# Patient Record
Sex: MALE | Race: WHITE | Employment: OTHER
[De-identification: names, ages, dates, MRNs, and addresses within clinical notes are randomized per-mention and may not be internally consistent; named-entity substitution may affect disease eponyms.]

---

## 2017-01-01 ENCOUNTER — HOME CARE VISIT (OUTPATIENT)
Dept: SCHEDULING | Facility: HOME HEALTH | Age: 82
End: 2017-01-01
Payer: MEDICARE

## 2017-01-01 ENCOUNTER — DOCUMENTATION ONLY (OUTPATIENT)
Dept: INTERNAL MEDICINE CLINIC | Age: 82
End: 2017-01-01

## 2017-01-01 ENCOUNTER — OFFICE VISIT (OUTPATIENT)
Dept: INTERNAL MEDICINE CLINIC | Age: 82
End: 2017-01-01

## 2017-01-01 ENCOUNTER — TELEPHONE (OUTPATIENT)
Dept: INTERNAL MEDICINE CLINIC | Age: 82
End: 2017-01-01

## 2017-01-01 ENCOUNTER — HOME HEALTH ADMISSION (OUTPATIENT)
Dept: HOME HEALTH SERVICES | Facility: HOME HEALTH | Age: 82
End: 2017-01-01
Payer: MEDICARE

## 2017-01-01 ENCOUNTER — OFFICE VISIT (OUTPATIENT)
Dept: NEUROLOGY | Age: 82
End: 2017-01-01

## 2017-01-01 ENCOUNTER — PATIENT OUTREACH (OUTPATIENT)
Dept: INTERNAL MEDICINE CLINIC | Age: 82
End: 2017-01-01

## 2017-01-01 ENCOUNTER — HOSPITAL ENCOUNTER (OUTPATIENT)
Dept: GENERAL RADIOLOGY | Age: 82
Discharge: HOME | End: 2017-04-21
Attending: INTERNAL MEDICINE
Payer: MEDICARE

## 2017-01-01 ENCOUNTER — HOSPITAL ENCOUNTER (OUTPATIENT)
Dept: GENERAL RADIOLOGY | Age: 82
Discharge: HOME | End: 2017-01-26
Payer: MEDICARE

## 2017-01-01 ENCOUNTER — HOSPITAL ENCOUNTER (OUTPATIENT)
Dept: LAB | Age: 82
Discharge: HOME | End: 2017-04-21
Payer: MEDICARE

## 2017-01-01 VITALS
OXYGEN SATURATION: 98 % | DIASTOLIC BLOOD PRESSURE: 78 MMHG | RESPIRATION RATE: 16 BRPM | HEART RATE: 76 BPM | SYSTOLIC BLOOD PRESSURE: 135 MMHG | TEMPERATURE: 98.6 F

## 2017-01-01 VITALS
TEMPERATURE: 98.6 F | OXYGEN SATURATION: 98 % | HEART RATE: 75 BPM | SYSTOLIC BLOOD PRESSURE: 124 MMHG | RESPIRATION RATE: 16 BRPM | DIASTOLIC BLOOD PRESSURE: 64 MMHG

## 2017-01-01 VITALS
WEIGHT: 132 LBS | OXYGEN SATURATION: 97 % | HEART RATE: 60 BPM | SYSTOLIC BLOOD PRESSURE: 120 MMHG | DIASTOLIC BLOOD PRESSURE: 60 MMHG | HEIGHT: 68 IN | BODY MASS INDEX: 20 KG/M2

## 2017-01-01 VITALS
HEART RATE: 78 BPM | OXYGEN SATURATION: 97 % | RESPIRATION RATE: 18 BRPM | DIASTOLIC BLOOD PRESSURE: 72 MMHG | SYSTOLIC BLOOD PRESSURE: 135 MMHG | TEMPERATURE: 98.6 F

## 2017-01-01 VITALS
TEMPERATURE: 98.6 F | HEART RATE: 78 BPM | TEMPERATURE: 98.6 F | HEART RATE: 75 BPM | SYSTOLIC BLOOD PRESSURE: 136 MMHG | RESPIRATION RATE: 16 BRPM | OXYGEN SATURATION: 98 % | SYSTOLIC BLOOD PRESSURE: 128 MMHG | OXYGEN SATURATION: 98 % | DIASTOLIC BLOOD PRESSURE: 65 MMHG | RESPIRATION RATE: 16 BRPM | DIASTOLIC BLOOD PRESSURE: 68 MMHG

## 2017-01-01 VITALS
RESPIRATION RATE: 12 BRPM | BODY MASS INDEX: 19.85 KG/M2 | HEART RATE: 57 BPM | SYSTOLIC BLOOD PRESSURE: 142 MMHG | OXYGEN SATURATION: 97 % | WEIGHT: 131 LBS | DIASTOLIC BLOOD PRESSURE: 52 MMHG | TEMPERATURE: 97.5 F | HEIGHT: 68 IN

## 2017-01-01 VITALS
OXYGEN SATURATION: 97 % | RESPIRATION RATE: 16 BRPM | WEIGHT: 132 LBS | BODY MASS INDEX: 20 KG/M2 | DIASTOLIC BLOOD PRESSURE: 50 MMHG | SYSTOLIC BLOOD PRESSURE: 122 MMHG | HEART RATE: 54 BPM | TEMPERATURE: 97.4 F | HEIGHT: 68 IN

## 2017-01-01 VITALS
DIASTOLIC BLOOD PRESSURE: 68 MMHG | RESPIRATION RATE: 16 BRPM | TEMPERATURE: 97.6 F | OXYGEN SATURATION: 98 % | HEART RATE: 79 BPM | SYSTOLIC BLOOD PRESSURE: 133 MMHG

## 2017-01-01 VITALS
SYSTOLIC BLOOD PRESSURE: 134 MMHG | HEART RATE: 75 BPM | OXYGEN SATURATION: 98 % | TEMPERATURE: 98.5 F | DIASTOLIC BLOOD PRESSURE: 69 MMHG | RESPIRATION RATE: 16 BRPM

## 2017-01-01 VITALS
TEMPERATURE: 98.6 F | OXYGEN SATURATION: 97 % | RESPIRATION RATE: 20 BRPM | SYSTOLIC BLOOD PRESSURE: 118 MMHG | HEART RATE: 62 BPM | DIASTOLIC BLOOD PRESSURE: 60 MMHG

## 2017-01-01 VITALS
TEMPERATURE: 98.6 F | DIASTOLIC BLOOD PRESSURE: 65 MMHG | HEART RATE: 79 BPM | SYSTOLIC BLOOD PRESSURE: 134 MMHG | OXYGEN SATURATION: 98 % | RESPIRATION RATE: 16 BRPM

## 2017-01-01 VITALS
RESPIRATION RATE: 16 BRPM | DIASTOLIC BLOOD PRESSURE: 68 MMHG | OXYGEN SATURATION: 98 % | HEART RATE: 77 BPM | TEMPERATURE: 98.5 F | SYSTOLIC BLOOD PRESSURE: 132 MMHG

## 2017-01-01 VITALS
SYSTOLIC BLOOD PRESSURE: 127 MMHG | DIASTOLIC BLOOD PRESSURE: 68 MMHG | OXYGEN SATURATION: 98 % | HEART RATE: 76 BPM | RESPIRATION RATE: 16 BRPM | TEMPERATURE: 98.6 F

## 2017-01-01 VITALS
TEMPERATURE: 97.5 F | DIASTOLIC BLOOD PRESSURE: 62 MMHG | HEART RATE: 61 BPM | OXYGEN SATURATION: 97 % | RESPIRATION RATE: 16 BRPM | SYSTOLIC BLOOD PRESSURE: 125 MMHG

## 2017-01-01 VITALS
SYSTOLIC BLOOD PRESSURE: 125 MMHG | DIASTOLIC BLOOD PRESSURE: 75 MMHG | OXYGEN SATURATION: 98 % | TEMPERATURE: 98.5 F | RESPIRATION RATE: 16 BRPM | HEART RATE: 73 BPM

## 2017-01-01 VITALS
OXYGEN SATURATION: 98 % | HEART RATE: 78 BPM | DIASTOLIC BLOOD PRESSURE: 68 MMHG | SYSTOLIC BLOOD PRESSURE: 132 MMHG | RESPIRATION RATE: 16 BRPM | TEMPERATURE: 98.6 F

## 2017-01-01 VITALS
SYSTOLIC BLOOD PRESSURE: 128 MMHG | HEART RATE: 78 BPM | DIASTOLIC BLOOD PRESSURE: 68 MMHG | TEMPERATURE: 98.6 F | RESPIRATION RATE: 16 BRPM | OXYGEN SATURATION: 98 %

## 2017-01-01 DIAGNOSIS — R41.0 DELIRIUM: ICD-10-CM

## 2017-01-01 DIAGNOSIS — G89.29 CHRONIC RIGHT-SIDED LOW BACK PAIN WITH RIGHT-SIDED SCIATICA: ICD-10-CM

## 2017-01-01 DIAGNOSIS — M54.41 CHRONIC RIGHT-SIDED LOW BACK PAIN WITH RIGHT-SIDED SCIATICA: ICD-10-CM

## 2017-01-01 DIAGNOSIS — Z02.2 ENCOUNTER FOR EXAMINATION FOR ADMISSION TO NURSING HOME: ICD-10-CM

## 2017-01-01 DIAGNOSIS — R44.3 HALLUCINATIONS: ICD-10-CM

## 2017-01-01 DIAGNOSIS — G30.9 ALZHEIMER'S DEMENTIA WITH BEHAVIORAL DISTURBANCE, UNSPECIFIED TIMING OF DEMENTIA ONSET: Primary | ICD-10-CM

## 2017-01-01 DIAGNOSIS — J61 PULMONARY ASBESTOSIS (HCC): ICD-10-CM

## 2017-01-01 DIAGNOSIS — Z02.2 ENCOUNTER FOR EXAMINATION FOR ADMISSION TO NURSING HOME: Primary | ICD-10-CM

## 2017-01-01 DIAGNOSIS — F02.81 ALZHEIMER'S DEMENTIA WITH BEHAVIORAL DISTURBANCE, UNSPECIFIED TIMING OF DEMENTIA ONSET: Primary | ICD-10-CM

## 2017-01-01 DIAGNOSIS — F05 SUNDOWNING: ICD-10-CM

## 2017-01-01 DIAGNOSIS — R29.6 FALLS FREQUENTLY: Primary | ICD-10-CM

## 2017-01-01 DIAGNOSIS — F03.91 DEMENTIA WITH BEHAVIORAL DISTURBANCE, UNSPECIFIED DEMENTIA TYPE: Primary | ICD-10-CM

## 2017-01-01 LAB
APPEARANCE UR: CLEAR
BACTERIA #/AREA URNS HPF: NORMAL /[HPF]
BILIRUB UR QL STRIP: NEGATIVE
BILIRUB UR QL STRIP: NEGATIVE
CASTS URNS QL MICRO: NORMAL /LPF
COLOR UR: YELLOW
EPI CELLS #/AREA URNS HPF: NORMAL /HPF
GLUCOSE UR QL: NEGATIVE
GLUCOSE UR-MCNC: NEGATIVE MG/DL
HGB UR QL STRIP: ABNORMAL
KETONES P FAST UR STRIP-MCNC: NEGATIVE MG/DL
KETONES UR QL STRIP: NEGATIVE
LEUKOCYTE ESTERASE UR QL STRIP: NEGATIVE
MICRO URNS: ABNORMAL
MUCOUS THREADS URNS QL MICRO: PRESENT
NITRITE UR QL STRIP: NEGATIVE
PH UR STRIP: 5.5 [PH] (ref 4.6–8)
PH UR STRIP: 6.5 [PH] (ref 5–7.5)
PROT UR QL STRIP: ABNORMAL
PROT UR QL STRIP: NORMAL MG/DL
RBC #/AREA URNS HPF: NORMAL /HPF
SP GR UR STRIP: 1.01 (ref 1–1.03)
SP GR UR: 1.02 (ref 1–1.03)
UA UROBILINOGEN AMB POC: NORMAL (ref 0.2–1)
URINALYSIS CLARITY POC: CLEAR
URINALYSIS COLOR POC: YELLOW
URINALYSIS REFLEX, 377202: ABNORMAL
URINE BLOOD POC: NORMAL
URINE LEUKOCYTES POC: NEGATIVE
URINE NITRITES POC: NEGATIVE
UROBILINOGEN UR STRIP-MCNC: 0.2 MG/DL (ref 0.2–1)
WBC #/AREA URNS HPF: NORMAL /HPF

## 2017-01-01 PROCEDURE — G0151 HHCP-SERV OF PT,EA 15 MIN: HCPCS

## 2017-01-01 PROCEDURE — 3331090002 HH PPS REVENUE DEBIT

## 2017-01-01 PROCEDURE — 3331090001 HH PPS REVENUE CREDIT

## 2017-01-01 PROCEDURE — 71020 XR CHEST PA LAT: CPT

## 2017-01-01 PROCEDURE — 3331090003 HH PPS REVENUE ADJ

## 2017-01-01 PROCEDURE — 72100 X-RAY EXAM L-S SPINE 2/3 VWS: CPT

## 2017-01-01 PROCEDURE — 73502 X-RAY EXAM HIP UNI 2-3 VIEWS: CPT

## 2017-01-01 PROCEDURE — 400013 HH SOC

## 2017-01-01 PROCEDURE — G0299 HHS/HOSPICE OF RN EA 15 MIN: HCPCS

## 2017-01-01 PROCEDURE — 81001 URINALYSIS AUTO W/SCOPE: CPT

## 2017-01-01 RX ORDER — ACETAMINOPHEN 325 MG/1
TABLET ORAL
COMMUNITY

## 2017-01-01 RX ORDER — QUETIAPINE FUMARATE 25 MG/1
25 TABLET, FILM COATED ORAL
Qty: 30 TAB | Refills: 2 | Status: SHIPPED | OUTPATIENT
Start: 2017-01-01

## 2017-01-26 NOTE — MR AVS SNAPSHOT
Visit Information Date & Time Provider Department Dept. Phone Encounter #  
 1/26/2017  1:15 PM Brendan Earl MD [address] [id number] Internal Medicine [phone] [id number] Upcoming Health Maintenance Date Due DTaP/Tdap/Td series (1 - Tdap) 10/1/1942 ZOSTER VACCINE AGE 60> 10/1/1981 Pneumococcal 65+ Low/Medium Risk (1 of 2 - PCV13) 10/1/1986 GLAUCOMA SCREENING Q2Y 8/6/2014 INFLUENZA AGE 9 TO ADULT 8/1/2016 MEDICARE YEARLY EXAM 7/9/2017 Allergies as of 1/26/2017  Review Complete On: 1/26/2017 By: Brendan Earl MD  
  
 Severity Noted Reaction Type Reactions Atenolol  11/09/2015    Unknown (comments) On history from Dr. Cullen Poster office Benicar [Olmesartan]  11/09/2015    Unknown (comments) Lipitor [Atorvastatin]  10/18/2010    Other (comments) Muscle aches Current Immunizations  Reviewed on 10/17/2013 Name Date Influenza High Dose Vaccine PF 10/30/2014, 10/17/2013 Influenza Vaccine Split 10/19/2012 Not reviewed this visit You Were Diagnosed With   
  
 Codes Comments Falls frequently    -  Primary ICD-10-CM: R29.6 ICD-9-CM: V15.88 Chronic right-sided low back pain with right-sided sciatica     ICD-10-CM: M54.41, G89.29 ICD-9-CM: 724.2, 724.3, 338.29 Vitals BP Pulse Temp Resp Height(growth percentile) Weight(growth percentile) 122/50 (!) 54 97.4 °F (36.3 °C) (Oral) 16 5' 8\" (1.727 m) 132 lb (59.9 kg) SpO2 BMI Smoking Status 97% 20.07 kg/m2 Former Smoker Vitals History BMI and BSA Data Body Mass Index Body Surface Area 20.07 kg/m 2 1.7 m 2 Preferred Pharmacy Pharmacy Name Phone [id number], [address] [place], [place] [phone] Your Updated Medication List  
  
   
This list is accurate as of: 1/26/17  2:00 PM.  Always use your most recent med list. amLODIPine 5 mg tablet Commonly known as:  Rudene Post Take 1 Tab by mouth two (2) times a day. aspirin 81 mg tablet Take 81 mg by mouth daily (with dinner). IMDUR 30 mg tablet Generic drug:  isosorbide mononitrate ER Take 30 mg by mouth daily. pindolol 5 mg tablet Commonly known as:  VISKIN  
TAKE 1/2 A TABLET BY MOUTH DAILY  
  
 SYSTANE (PF) 0.4-0.3 % Dpet ophthalmic solution Generic drug:  peg 400-propylene glycol (PF) Administer 1 Drop to both eyes daily. terazosin 5 mg capsule Commonly known as:  HYTRIN Take 5 mg by mouth nightly. TRAVATAN Z 0.004 % ophthalmic solution Generic drug:  travoprost  
Administer 1 Drop to both eyes nightly. TYLENOL 325 mg tablet Generic drug:  acetaminophen Take  by mouth every four (4) hours as needed for Pain. VITAMIN D3 1,000 unit Cap Generic drug:  cholecalciferol Take 1,000 Units by mouth daily. We Performed the Following [address] To-Do List   
 01/26/2017 Imaging:  XR HIP RT W OR WO PELV 2-3 VWS   
  
 01/26/2017 Imaging:  XR SPINE LUMB 2 OR 3 V Referral Information Referral ID Referred By Referred To  
  
 [id number] Jamie DURAN Not Available Visits Status Start Date End Date 1 New Request 1/26/17 1/26/18 If your referral has a status of pending review or denied, additional information will be sent to support the outcome of this decision. Patient Instructions Therapeutic Ball: Back Exercises Your Care Instructions Here are some examples of typical exercises for your condition. Start each exercise slowly. Ease off the exercise if you start to have pain. Your doctor or physical therapist will tell you when you can start these exercises and which ones will work best for you. To prepare, make sure that your ball is the right size for you.  When inflated and firm, it should allow you to sit with your hips and knees bent at about a 90-degree angle (like the letter L). How to do the exercises Seated position on ball Use this exercise to get used to moving on the ball and to find your best sitting position. 1. Sit comfortably on the ball with your feet about hip-width apart. If you feel unsteady, rest your hands on the ball near your hips. 2. As you do this exercise, try to keep your shoulders and upper body relaxed and still. 3. Using your stomach and back muscles to move your pelvis, roll the ball forward. This will round your back. 4. Still using your stomach and back muscles, roll the ball back. You will arch your back. 5. Repeat this rounding-arching motion a few times. 6. Stop in between the two positions, where your back is not rounded or arched. This is called your neutral position. Pelvic rotation 1. Sit tall on the ball. 2. Slowly rotate your hips in a [place] pattern. Keep the movement focused at your hips. 3. Repeat, but [place] in the other direction. 4. Repeat 8 to 12 times. Postural sitting Use this position to find a stable, relaxed posture on the ball. You can use this position as your starting point for other ball exercises. If you feel unsteady on the ball, start on a chair first. 
1. Sit on a ball or chair, with your feet planted straight in front of you. 2. Imagine that a string at the top of your head is pulling you straight up. Think of yourself as 2 inches taller than you are. 
3. Slightly tuck your chin. 4. Keep your shoulders back and relaxed. Knee extension 1. Sit tall on the ball with your feet planted in front of you, hip-width apart. As you do this exercise, avoid slumping your shoulders and arching your back. 2. Rest your hands on the ball near your hip or a steady object next to you. (If you feel very stable on the ball, rest your hands in your lap or at your side.) 3. Slowly straighten one leg at the knee. Slowly lower it back down. Repeat with the other leg. 4. Repeat this exercise 8 to 12 times. Roll-ups 1. Lie on your back with your knees bent, feet resting on the floor. 2. Lay the ball on your thighs. Rest your hands up high on the ball. 3. Raising your head and shoulder blades, roll the ball up your thighs. Exhale as you roll up. 4. If this is hard on your neck, gently support your lower head and upper neck with one hand. Don't use that hand to pull your head up. 5. Repeat 8 to 12 times. Ball curls 1. Lie on your back with your ankles resting on the ball, knees straight. 2. Use your legs to roll the exercise ball toward you. Allow your knees to bend and move closer to your chest. 
3. Pause briefly, and then roll the ball to the starting position. Try to keep the ball rolling straight. You will feel the muscles in your lower belly working. 4. Repeat 8 to 12 times. Bridge with ball under legs 1. Lie on your back with your legs up, calves resting on the ball. For more challenge, rest your heels on the ball. 2. Look up at the ceiling, and keep your chin relaxed. You can place a small pillow under your head or neck for comfort. 3. With your arms by your side, press your hands onto the floor for stability. 4. Tighten your belly muscles by pulling in your belly button toward your spine. 5. Push your heels down toward the floor, squeeze your buttocks, and lift your hips off the floor until your shoulders, hips, and knees are all in a straight line. 6. Try to keep the ball steady. Hold for about 6 seconds as you continue to breathe normally. 7. Slowly lower your hips back down to the floor. 8. Repeat 8 to 12 times. Ball curls with bridge 1. Start flat on your back with your ankles resting on the ball. 2. Look up at the ceiling, and keep your chin relaxed. You can place a small pillow under your head or neck for comfort. 3. With your arms by your side, press your hands onto the floor for stability. 4. Tighten your belly muscles by pulling in your belly button toward your spine. 5. Push your heels down toward the floor, squeeze your buttocks, and lift your hips off the floor until your shoulders, hips, and knees are all in a straight line. 6. While holding the bridge position, roll the ball toward you with your heels. Keep your hips as level as you can. 7. Pause briefly, and then roll the ball back out. Try to keep the ball rolling straight. You will feel the muscles in your lower belly working as you straighten your legs. 8. Lower your hips, and return to your starting position. 9. Repeat 8 to 12 times. When you can keep your body and the ball steady throughout this exercise, you're ready for more challenge. Try keeping your hips raised while rolling the ball out, holding the bridge, and rolling back, a few times in a row. Praying luke 1. Kneel upright with the ball in front of you. 2. To start, clasp your hands together. Rest them on the ball in front of you. 3. As you do this exercise, keep your back and hips straight and tighten your belly and buttocks muscles. Keep your knees in place. 4. Press on the ball with your arms. Lean forward from the knees. This rolls the ball forward. You will bear most of your weight on your arms. 5. If your back starts to ache, you've gone too far. Pull back a bit. 6. Roll back to the start position. 7. Repeat 8 to 12 times. Walk-out plank on ball 1. Kneel over the ball. Place your hands on the floor in front of you. 2. Walk your hands forward until your legs are straight on the ball. This is the plank position. 3. When in plank position, hold your body straight and tighten your belly and buttocks muscles. Keep your chin slightly tucked. 4. Roll as far forward as you can without losing your balance or letting your hips drop. You may stop with the ball under your thighs, or even under your knees or shins. 5. Hold a few seconds, then walk your hands back and return to the start position. 6. Repeat 8 to 12 times. Push-up with thighs on ball 1. Kneel over the ball. Place your hands on the floor in front of you. 2. Walk your hands forward until your legs are straight on the ball. This is the plank position. 3. When in plank position, hold your body straight and tighten your belly and buttocks muscles. Keep your chin slightly tucked. 4. Roll as far forward as you can without losing your balance or letting your hips drop. You may stop with the ball under your thighs, or even under your knees or shins. 5. Bend your elbows. Slowly lower your body toward the ground as far as you can without losing your balance. 6. If your wrists hurt, try moving your hands a little farther apart so they're not right under your shoulders. 7. Slowly straighten your arms. 8. Do 8 to 12 of these push-ups. Wall squat with ball 1. Stand facing away from a wall. Place your feet about shoulder-width apart. 2. Place the ball between your middle back and the wall. Move your feet out in front of you so they are about a foot in front of your hips. 3. Keep your arms at your sides, or put your hands on your hips. 4. Slowly squat down as if you are going to sit in a chair, rolling your back over the ball as you squat. The ball should move with you but stay pressed into the wall. 5. Be sure that your knees do not go in front of your toes as you squat. 6. Hold for 6 seconds. 7. Slowly rise to your standing position. 8. Repeat 8 to 12 times. Child's pose with ball 1. Kneeling upright with your back straight, rest your hands on the ball in front of you. 2. Breathe out as you bend at the hips, and roll the ball forward. Lower your chest toward the ground, and drop your hips back toward your heels. 3. To stretch your upper back and shoulders, hold this position for 15 to 30 seconds. 4. Repeat 2 to 4 times. Follow-up care is a key part of your treatment and safety. Be sure to make and go to all appointments, and call your doctor if you are having problems. It's also a good idea to know your test results and keep a list of the medicines you take. Where can you learn more? Go to http://isadora-zhanna.info/. Enter G148 in the search box to learn more about \"Therapeutic Ball: Back Exercises. \" Current as of: May 23, 2016 Content Version: 11.1 © 1554-4546 [employer]. Care instructions adapted under license by [employer] (which disclaims liability or warranty for this information). If you have questions about a medical condition or this instruction, always ask your healthcare professional. [address] any warranty or liability for your use of this information. Back Stretches: Exercises Your Care Instructions Here are some examples of exercises for stretching your back. Start each exercise slowly. Ease off the exercise if you start to have pain. Your doctor or physical therapist will tell you when you can start these exercises and which ones will work best for you. How to do the exercises Overhead stretch 7. Stand comfortably with your feet shoulder-width apart. 8. Looking straight ahead, raise both arms over your head and reach toward the ceiling. Do not allow your head to tilt back. 9. Hold for 15 to 30 seconds, then lower your arms to your sides. 10. Repeat 2 to 4 times. Side stretch 5. Stand comfortably with your feet shoulder-width apart. 6. Raise one arm over your head, and then lean to the other side. 7. Slide your hand down your leg as you let the weight of your arm gently stretch your side muscles. Hold for 15 to 30 seconds. 8. Repeat 2 to 4 times on each side. Press-up 5. Lie on your stomach, supporting your body with your forearms. 6. Press your elbows down into the floor to raise your upper back.  As you do this, relax your stomach muscles and allow your back to arch without using your back muscles. As your press up, do not let your hips or pelvis come off the floor. 7. Hold for 15 to 30 seconds, then relax. 8. Repeat 2 to 4 times. Relax and rest 
 
5. Lie on your back with a rolled towel under your neck and a pillow under your knees. Extend your arms comfortably to your sides. 6. Relax and breathe normally. 7. Remain in this position for about 10 minutes. 8. If you can, do this 2 or 3 times each day. Follow-up care is a key part of your treatment and safety. Be sure to make and go to all appointments, and call your doctor if you are having problems. It's also a good idea to know your test results and keep a list of the medicines you take. Where can you learn more? Go to http://isadora-zhanna.info/. Enter K340 in the search box to learn more about \"Back Stretches: Exercises. \" Current as of: May 23, 2016 Content Version: 11.1 © 1241-1341 [employer]. Care instructions adapted under license by [employer] (which disclaims liability or warranty for this information). If you have questions about a medical condition or this instruction, always ask your healthcare professional. [address] any warranty or liability for your use of this information. Introducing [facility]! Madhuri Small introduces Repair Report patient portal. Now you can access parts of your medical record, email your doctor's office, and request medication refills online. 1. In your internet browser, go to [email or web address] 2. Click on the First Time User? Click Here link in the Sign In box. You will see the New Member Sign Up page. 3. Enter your Repair Report Access Code exactly as it appears below. You will not need to use this code after youve completed the sign-up process.  If you do not sign up before the expiration date, you must request a new code. 
 
· FinanzCheck Access Code: [email or web address] Expires: 4/26/2017  2:00 PM 
 
4. Enter the last four digits of your Social Security Number (xxxx) and Date of Birth (mm/dd/yyyy) as indicated and click Submit. You will be taken to the next sign-up page. 5. Create a FinanzCheck ID. This will be your [employer]Check login ID and cannot be changed, so think of one that is secure and easy to remember. 6. Create a [employer]anzCheck password. You can change your password at any time. 7. Enter your Password Reset Question and Answer. This can be used at a later time if you forget your password. 8. Enter your e-mail address. You will receive e-mail notification when new information is available in [address] 9. Click Sign Up. You can now view and download portions of your medical record. 10. Click the Download Summary menu link to download a portable copy of your medical information. If you have questions, please visit the Frequently Asked Questions section of the [employer] website. Remember, [employer]anzCheck is NOT to be used for urgent needs. For medical emergencies, dial 911. Now available from your iPhone and Android! Please provide this summary of care documentation to your next provider. Your primary care clinician is listed as [place] [phone] If you have any questions after today's visit, please call [phone].

## 2017-01-26 NOTE — PROGRESS NOTES
Chief Complaint   Patient presents with    Hip Pain     right patient states \"started really bad but now 60% better\" - per son taking tylenol never tried the [employer] Fall     4 falls over the past 6 months only injury was a right hand laceration

## 2017-01-26 NOTE — PATIENT INSTRUCTIONS
Therapeutic Ball: Back Exercises  Your Care Instructions  Here are some examples of typical exercises for your condition. Start each exercise slowly. Ease off the exercise if you start to have pain. Your doctor or physical therapist will tell you when you can start these exercises and which ones will work best for you. To prepare, make sure that your ball is the right size for you. When inflated and firm, it should allow you to sit with your hips and knees bent at about a 90-degree angle (like the letter L). How to do the exercises  Seated position on ball    Use this exercise to get used to moving on the ball and to find your best sitting position. 1. Sit comfortably on the ball with your feet about hip-width apart. If you feel unsteady, rest your hands on the ball near your hips. 2. As you do this exercise, try to keep your shoulders and upper body relaxed and still. 3. Using your stomach and back muscles to move your pelvis, roll the ball forward. This will round your back. 4. Still using your stomach and back muscles, roll the ball back. You will arch your back. 5. Repeat this rounding-arching motion a few times. 6. Stop in between the two positions, where your back is not rounded or arched. This is called your neutral position. Pelvic rotation    1. Sit tall on the ball. 2. Slowly rotate your hips in a Nikolski pattern. Keep the movement focused at your hips. 3. Repeat, but [place] in the other direction. 4. Repeat 8 to 12 times. Postural sitting    Use this position to find a stable, relaxed posture on the ball. You can use this position as your starting point for other ball exercises. If you feel unsteady on the ball, start on a chair first.  1. Sit on a ball or chair, with your feet planted straight in front of you. 2. Imagine that a string at the top of your head is pulling you straight up. Think of yourself as 2 inches taller than you are.  3. Slightly tuck your chin.   4. Keep your shoulders back and relaxed. Knee extension    1. Sit tall on the ball with your feet planted in front of you, hip-width apart. As you do this exercise, avoid slumping your shoulders and arching your back. 2. Rest your hands on the ball near your hip or a steady object next to you. (If you feel very stable on the ball, rest your hands in your lap or at your side.)  3. Slowly straighten one leg at the knee. Slowly lower it back down. Repeat with the other leg. 4. Repeat this exercise 8 to 12 times. Roll-ups    1. Lie on your back with your knees bent, feet resting on the floor. 2. Lay the ball on your thighs. Rest your hands up high on the ball. 3. Raising your head and shoulder blades, roll the ball up your thighs. Exhale as you roll up. 4. If this is hard on your neck, gently support your lower head and upper neck with one hand. Don't use that hand to pull your head up. 5. Repeat 8 to 12 times. Ball curls    1. Lie on your back with your ankles resting on the ball, knees straight. 2. Use your legs to roll the exercise ball toward you. Allow your knees to bend and move closer to your chest.  3. Pause briefly, and then roll the ball to the starting position. Try to keep the ball rolling straight. You will feel the muscles in your lower belly working. 4. Repeat 8 to 12 times. Bridge with ball under legs    1. Lie on your back with your legs up, calves resting on the ball. For more challenge, rest your heels on the ball. 2. Look up at the ceiling, and keep your chin relaxed. You can place a small pillow under your head or neck for comfort. 3. With your arms by your side, press your hands onto the floor for stability. 4. Tighten your belly muscles by pulling in your belly button toward your spine. 5. Push your heels down toward the floor, squeeze your buttocks, and lift your hips off the floor until your shoulders, hips, and knees are all in a straight line. 6. Try to keep the ball steady.  Hold for about 6 seconds as you continue to breathe normally. 7. Slowly lower your hips back down to the floor. 8. Repeat 8 to 12 times. Ball curls with bridge    1. Start flat on your back with your ankles resting on the ball. 2. Look up at the ceiling, and keep your chin relaxed. You can place a small pillow under your head or neck for comfort. 3. With your arms by your side, press your hands onto the floor for stability. 4. Tighten your belly muscles by pulling in your belly button toward your spine. 5. Push your heels down toward the floor, squeeze your buttocks, and lift your hips off the floor until your shoulders, hips, and knees are all in a straight line. 6. While holding the bridge position, roll the ball toward you with your heels. Keep your hips as level as you can. 7. Pause briefly, and then roll the ball back out. Try to keep the ball rolling straight. You will feel the muscles in your lower belly working as you straighten your legs. 8. Lower your hips, and return to your starting position. 9. Repeat 8 to 12 times. When you can keep your body and the ball steady throughout this exercise, you're ready for more challenge. Try keeping your hips raised while rolling the ball out, holding the bridge, and rolling back, a few times in a row. Praying luke    1. Kneel upright with the ball in front of you. 2. To start, clasp your hands together. Rest them on the ball in front of you. 3. As you do this exercise, keep your back and hips straight and tighten your belly and buttocks muscles. Keep your knees in place. 4. Press on the ball with your arms. Lean forward from the knees. This rolls the ball forward. You will bear most of your weight on your arms. 5. If your back starts to ache, you've gone too far. Pull back a bit. 6. Roll back to the start position. 7. Repeat 8 to 12 times. Walk-out plank on ball    1. Kneel over the ball. Place your hands on the floor in front of you.   2. Walk your hands forward until your legs are straight on the ball. This is the plank position. 3. When in plank position, hold your body straight and tighten your belly and buttocks muscles. Keep your chin slightly tucked. 4. Roll as far forward as you can without losing your balance or letting your hips drop. You may stop with the ball under your thighs, or even under your knees or shins. 5. Hold a few seconds, then walk your hands back and return to the start position. 6. Repeat 8 to 12 times. Push-up with thighs on ball    1. Kneel over the ball. Place your hands on the floor in front of you. 2. Walk your hands forward until your legs are straight on the ball. This is the plank position. 3. When in plank position, hold your body straight and tighten your belly and buttocks muscles. Keep your chin slightly tucked. 4. Roll as far forward as you can without losing your balance or letting your hips drop. You may stop with the ball under your thighs, or even under your knees or shins. 5. Bend your elbows. Slowly lower your body toward the ground as far as you can without losing your balance. 6. If your wrists hurt, try moving your hands a little farther apart so they're not right under your shoulders. 7. Slowly straighten your arms. 8. Do 8 to 12 of these push-ups. Wall squat with ball    1. Stand facing away from a wall. Place your feet about shoulder-width apart. 2. Place the ball between your middle back and the wall. Move your feet out in front of you so they are about a foot in front of your hips. 3. Keep your arms at your sides, or put your hands on your hips. 4. Slowly squat down as if you are going to sit in a chair, rolling your back over the ball as you squat. The ball should move with you but stay pressed into the wall. 5. Be sure that your knees do not go in front of your toes as you squat. 6. Hold for 6 seconds. 7. Slowly rise to your standing position. 8. Repeat 8 to 12 times.   Child's pose with ball    1. Kneeling upright with your back straight, rest your hands on the ball in front of you. 2. Breathe out as you bend at the hips, and roll the ball forward. Lower your chest toward the ground, and drop your hips back toward your heels. 3. To stretch your upper back and shoulders, hold this position for 15 to 30 seconds. 4. Repeat 2 to 4 times. Follow-up care is a key part of your treatment and safety. Be sure to make and go to all appointments, and call your doctor if you are having problems. It's also a good idea to know your test results and keep a list of the medicines you take. Where can you learn more? Go to http://isadora-zhanna.info/. Enter C036 in the search box to learn more about \"Therapeutic Ball: Back Exercises. \"  Current as of: May 23, 2016  Content Version: 11.1  © 2006-2016 [employer] Care instructions adapted under license by [employer] (which disclaims liability or warranty for this information). If you have questions about a medical condition or this instruction, always ask your healthcare professional. [place] [address] any warranty or liability for your use of this information. Back Stretches: Exercises  Your Care Instructions  Here are some examples of exercises for stretching your back. Start each exercise slowly. Ease off the exercise if you start to have pain. Your doctor or physical therapist will tell you when you can start these exercises and which ones will work best for you. How to do the exercises  Overhead stretch    7. Stand comfortably with your feet shoulder-width apart. 8. Looking straight ahead, raise both arms over your head and reach toward the ceiling. Do not allow your head to tilt back. 9. Hold for 15 to 30 seconds, then lower your arms to your sides. 10. Repeat 2 to 4 times. Side stretch    5. Stand comfortably with your feet shoulder-width apart.   6. Raise one arm over your head, and then lean to the other side. 7. Slide your hand down your leg as you let the weight of your arm gently stretch your side muscles. Hold for 15 to 30 seconds. 8. Repeat 2 to 4 times on each side. Press-up    5. Lie on your stomach, supporting your body with your forearms. 6. Press your elbows down into the floor to raise your upper back. As you do this, relax your stomach muscles and allow your back to arch without using your back muscles. As your press up, do not let your hips or pelvis come off the floor. 7. Hold for 15 to 30 seconds, then relax. 8. Repeat 2 to 4 times. Relax and rest    5. Lie on your back with a rolled towel under your neck and a pillow under your knees. Extend your arms comfortably to your sides. 6. Relax and breathe normally. 7. Remain in this position for about 10 minutes. 8. If you can, do this 2 or 3 times each day. Follow-up care is a key part of your treatment and safety. Be sure to make and go to all appointments, and call your doctor if you are having problems. It's also a good idea to know your test results and keep a list of the medicines you take. Where can you learn more? Go to http://isadora-zhanna.info/. Enter R893 in the search box to learn more about \"Back Stretches: Exercises. \"  Current as of: May 23, 2016  Content Version: 11.1  © 9320-3980 [employer]. Care instructions adapted under license by [employer] (which disclaims liability or warranty for this information). If you have questions about a medical condition or this instruction, always ask your healthcare professional. [address] any warranty or liability for your use of this information.

## 2017-01-27 NOTE — PROGRESS NOTES
HISTORY OF PRESENT ILLNESS  Creola Ormond is a 80 y.o. male. HPI  Presents for an issue with falls. Has had several over the last few months for no apparent reason. He is living at home and is not driving/. He has mostly he thinks lost his balance. Last in the last month on his buttocks with a skin tear on the left hand. Now with pain in the right hip and buttocks and lower back. Some improvement. No radiation of the pain to the foot. No weakness. No change in bowels or bladder. No fever or chills. He is hard of hearing and communication is difficult and depend mostly on written notes. He has recently seen the renal MD and had labs. Past Medical History   Diagnosis Date    Arrhythmia      irregular    Arthritis      SHOULDERS AND BACK    Cancer ([facility] [address] 8/00     prostate - seed implants    Cancer ([facility] [address]      BLADDER    Cancer ([facility] [address]      SKIN CA ON HEAD, FACE    Glaucoma     History of prostate cancer 5/24/2011    Hypertension     Mixed hyperlipidemia     Rectal bleeding      post prostate seeds    Stroke [facility])      TIA    Thyroid disease      NO MEDS CURRENTLY (9/12/16)     Past Surgical History   Procedure Laterality Date    Hx appendectomy       patient denies    Hx hernia repair       bilateral inguinal    Hx gi       COLONOSCOPY    Hx urological       urethral stricture    Hx prostatectomy       biopsy AND RADIOACTIVE SEEDS    Hx cataract removal Right      Current Outpatient Prescriptions on File Prior to Visit   Medication Sig Dispense Refill    pindolol (VISKIN) 5 mg tablet TAKE 1/2 A TABLET BY MOUTH DAILY 45 Tab 1    peg 400-propylene glycol, PF, (SYSTANE, PF,) 0.4-0.3 % dpet ophthalmic solution Administer 1 Drop to both eyes daily.  travoprost (TRAVATAN Z) 0.004 % ophthalmic solution Administer 1 Drop to both eyes nightly.  amLODIPine (NORVASC) 5 mg tablet Take 1 Tab by mouth two (2) times a day.       Cholecalciferol, Vitamin D3, (VITAMIN D) 1,000 unit Cap Take 1,000 Units by mouth daily.  aspirin 81 mg tablet Take 81 mg by mouth daily (with dinner).  terazosin (HYTRIN) 5 mg capsule Take 5 mg by mouth nightly.  isosorbide mononitrate ER (IMDUR) 30 mg tablet Take 30 mg by mouth daily. No current facility-administered medications on file prior to visit. ROS  Per HPI  Physical Exam  Physical Examination: General appearance - alert, well appearing, and in no distress  Chest - clear to auscultation, no wheezes, rales or rhonchi, symmetric air entry  Heart - normal rate, regular rhythm, normal S1, S2, no murmurs, rubs, clicks or gallops  Abdomen - soft, nontender, nondistended, no masses or organomegaly  Back exam - limited range of motion, pain with motion noted during exam, tenderness noted along the lower lumbar muscles  Neurological - alert, oriented, normal speech, no focal findings or movement disorder noted, motor and sensory grossly normal bilaterally  Musculoskeletal - abnormal exam of right hip with mild tenderness over the sciatic groove. No swelling or redness. Negative SLR. 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 Plan -   Orders Placed This Encounter    XR SPINE LUMB 2 OR 3 V     Standing Status:   Future     Number of Occurrences:   1     Standing Expiration Date:   2/26/2018     Order Specific Question:   Reason for Exam     Answer:   low back pain after a fall     Order Specific Question:   Is Patient Allergic to Contrast Dye? Answer:   No    XR HIP RT W OR WO PELV 2-3 VWS     Standing Status:   Future     Number of Occurrences:   1     Standing Expiration Date:   2/26/2018     Order Specific Question:   Reason for Exam     Answer:   right hip pain     Order Specific Question:   Is Patient Allergic to Contrast Dye?      Answer:   No    REFERRAL TO HOME HEALTH     Referral Priority: Routine     Referral Type:   Home Health Evaluation     Referral Reason:   Continuity of Care    acetaminophen (TYLENOL) 325 mg tablet     Sig: Take  by mouth every four (4) hours as needed for Pain. Followup if the pain persists  Advised him to call back or return to office if symptoms worsen/change/persist.  Discussed expected course/resolution/complications of diagnosis in detail with patient. Medication risks/benefits/costs/interactions/alternatives discussed with patient. He was given an after visit summary which includes diagnoses, current medications, & vitals. He expressed understanding with the diagnosis and plan.

## 2017-01-30 NOTE — TELEPHONE ENCOUNTER
Unable to contact patient at this time. Left message to return call. Baldemar Sales is on [facility].

## 2017-02-01 PROBLEM — H91.93 BILATERAL HEARING LOSS: Status: ACTIVE | Noted: 2017-01-01

## 2017-02-01 NOTE — TELEPHONE ENCOUNTER
Spoke with pt son Monika Simmons who is on CardiOx. 2 pt identifiers. Explained that xr was normal. [facility] does recommend pt getting eval for home health for PT/Safety. Order placed again for home health. They are to call Les to schedule time.

## 2017-03-20 NOTE — TELEPHONE ENCOUNTER
Stuart Akbar (son) (EC) [phone]     pt's son requesting a call back regarding a letter that he left for dr Inessa khan about seeing a neurologist.

## 2017-03-23 NOTE — TELEPHONE ENCOUNTER
Spoke with pt son Annalee Posada who is on hippa. 2 pt identifiers. Advised that [facility] did receive the letter that he sent asking about his Dad seeing Don/Dr. Janene Shetty. Per [facility], he thinks it is a good idea. He states that he talked to their office and I need to call to give a \"referral\" for him to be seen. I will call the office.

## 2017-04-03 NOTE — TELEPHONE ENCOUNTER
tr Jaimee Money, son (EC) [phone]     pt's son calling regarding some concerns about his father's condition, he seems to talking about things that are not making any sense, and he and his brother are not sure what route they should take.

## 2017-04-19 NOTE — LETTER
4/19/2017 1:37 PM 
 
Patient:    Ruhtann Larios Sr. YOB: 1921 Date of Visit:    4/19/2017 Dear Jamin Walters MD 
 
Thank you for referring Mr. Kemper Gitelman to me for evaluation/treatment. Below are the relevant portions of my assessment and plan of care. NEUROLOGY NEW PATIENT CONSULATION 
REFERRED BY: 
Jamin Walters MD 
 
04/19/17 Chief Complaint Patient presents with  New Patient Memory loss HISTORY OF PRESENT ILLNESS Lindsay Lugo is a 80 y.o. male who presented to the neurology office for regarding memory problem. The patient was accompanied here by his son and all the history is provided by him. The patient does have visual impairment and does also have problems with hearing. According to him, the patient started having problems with memory when he was in his late [de-identified]. It was noticed that he was slowing down and unable to walk as fast and as far as he was able to do. He started having problems with remembering names and words. By 2015, the patient is dining room table was unusually busy with pills, junk meal, banking statement and other documents. The patient did complain that he is concerned about the because he was receiving. At that point of time the son took over the financial affairs. With time he started having confusion with the medications that he was taking. The patient continued to drive until 7256 but was stopped by the police and was told to stop driving. He continued to cook by himself occasionally until April 2017 but stopped doing that after that. The patient did also start having hallucinations and the patient sees other people in the house. Other than that patient does have inability to recall words while he is talking. He often gets frustrated trying to express himself. He is presently staying at home and there is a caregiver that comes at night.   He dresses himself and claims himself after targeting and takes his medications when given to him. He can prepare simple meals. He has not had any imaging study of the brain Current Outpatient Prescriptions Medication Sig  
 memantine (NAMENDA XR) 7-14-21-28 mg C24k Take 1 Tab by mouth daily.  QUEtiapine (SEROQUEL) 25 mg tablet Take 1 Tab by mouth nightly.  acetaminophen (TYLENOL) 325 mg tablet Take  by mouth every four (4) hours as needed for Pain.  pindolol (VISKIN) 5 mg tablet TAKE 1/2 A TABLET BY MOUTH DAILY  peg 400-propylene glycol, PF, (SYSTANE, PF,) 0.4-0.3 % dpet ophthalmic solution Administer 1 Drop to both eyes daily.  travoprost (TRAVATAN Z) 0.004 % ophthalmic solution Administer 1 Drop to both eyes nightly.  amLODIPine (NORVASC) 5 mg tablet Take 1 Tab by mouth two (2) times a day.  Cholecalciferol, Vitamin D3, (VITAMIN D) 1,000 unit Cap Take 1,000 Units by mouth daily.  aspirin 81 mg tablet Take 81 mg by mouth daily (with dinner).  terazosin (HYTRIN) 5 mg capsule Take 5 mg by mouth nightly.  isosorbide mononitrate ER (IMDUR) 30 mg tablet Take 30 mg by mouth daily. No current facility-administered medications for this visit. Allergies Allergen Reactions  Atenolol Unknown (comments) On history from Dr. David Gaming office  Benicar [Olmesartan] Unknown (comments)  Lipitor [Atorvastatin] Other (comments) Muscle aches Past Medical History:  
Diagnosis Date  Arrhythmia   
 irregular  Arthritis SHOULDERS AND BACK  BPH (benign prostatic hypertrophy)  Cancer ([facility] [address] 8/00  
 prostate - seed implants  Cancer ([facility] BLADDER  
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   
 post prostate seeds  Sinus bradycardia  SSS (sick sinus syndrome) ([address]  Stroke ([address] TIA  Thyroid disease NO MEDS CURRENTLY (9/12/16) Past Surgical History:  
Procedure Laterality Date  HX APPENDECTOMY patient denies  HX CATARACT REMOVAL Right  HX GI    
 COLONOSCOPY  
 HX HERNIA REPAIR    
 bilateral inguinal  
 HX PROSTATECTOMY    
 biopsy AND RADIOACTIVE SEEDS  
 HX UROLOGICAL    
 urethral stricture Family History Problem Relation Age of Onset  Cancer Mother   
  liver/brain  Heart Disease Father  Stroke Father   
  possible unk  Heart Disease Brother  Alcohol abuse Brother  Anesth Problems Neg Hx Social History Substance Use Topics  Smoking status: Former Smoker Packs/day: 1.00 Years: 20.00 Quit date: 11/6/1965  Smokeless tobacco: Never Used  Alcohol use Yes Comment: occas. REVIEW OF SYSTEMS:  
A ten system review of constitutional, cardiovascular, respiratory, musculoskeletal, endocrine, skin, SHEENT, genitourinary, psychiatric and neurologic systems was obtained and is unremarkable with the exception of memory problem, vision impairment and hearing loss EXAMINATION:  
Visit Vitals  /60  Pulse 60  Ht 5' 8\" (1.727 m)  Wt 132 lb (59.9 kg)  SpO2 97%  BMI 20.07 kg/m2 General:  
General appearance: Pt is in no acute distress Distal pulses are preserved Fundoscopic Exam: Attempted Neurological Examination:  
Mental Status: AAO x1 (he knows his name and year but does not the month. He knows that he stays in [place] but does not remember the ZIP Code). Tray Pineda Speech is slow. Follows commands, has poor fund of knowledge, attention, short term recall, comprehension and insight. Cranial Nerves: Visual fields could not be assessed. PERRL, Extraocular movements are full. Facial sensation intact V1- V3. Facial movement intact, symmetric. Hearing intact to conversation. Palate elevates symmetrically. Shoulder shrug symmetric. Tongue midline. Motor: Strength is 5/5 in all 4 ext. No atrophy. Tone: Normal 
 
Sensation: Normal to light touch Reflexes: DTRs trace throughout. Plantar responses downgoing. Coordination/Cerebellar: Deferred because of vision Gait: Deferred because of fall risk Skin: No significant bruising or lacerations. Laboratory review:  
Results for orders placed or performed in visit on 12/13/16 AMB EXT CREATININE Result Value Ref Range Creatinine, External 1.58 Imaging review: 
None Documentation review: 
None Assessment/Plan:  
Jewel Blackwood is a 80 y.o. male who presented to the neurology office for management of memory problem. He is oriented ×1. He does seem to have moderate to severely advanced dementia. He has not had any imaging of the brain and I do plan to get CT scan of the head. I do plan to start him on titrating dose of Namenda XR. Aricept cannot be tried because of his bradycardia. The patient will also be restarted on Seroquel 25 mg p.o. nightly for agitation and hallucination. The patient is living by himself but needs to be in assisted living. In fact, getting into a nursing home is not a bad idea. Follow-up in 3 months ICD-10-CM ICD-9-CM 1. Alzheimer's dementia with behavioral disturbance, unspecified timing of dementia onset G30.8 331.0 memantine (NAMENDA XR) 7-14-21-28 mg C24k F02.81 294.11 CT HEAD WO CONT 2. Hallucinations R44.3 780.1 QUEtiapine (SEROQUEL) 25 mg tablet Thank you for allowing me to participate in the care of Mr. Shoshana Jules. Please feel free to contact me if you have any questions. Dwayne Bonds MD 
Diplomate, American Board of Psychiatry & Neurology (Neurology) [facility] Board of Psychiatry & Neurology (Clinical Neurophysiology) CC: Lenny Prasad MD 
Fax: [phone] This note was created using voice recognition software. Despite editing, there may be syntax errors. This note will not be viewable in [address] If you have questions, please do not hesitate to call me. I look forward to following Mr. Sergio Michael along with you. Sincerely, Michelle Gaines MD

## 2017-04-19 NOTE — PATIENT INSTRUCTIONS
1.  CT scan of the head  2. Namenda XR 7 mg every day for a week, 14 mg every day for a week, 21 mg every day for a week and 28 mg every day after that  3. Seroquel 25 mg p.o. nightly  4. Follow-up in 3 months      A Healthy Lifestyle: Care Instructions  Your Care Instructions  A healthy lifestyle can help you feel good, stay at a healthy weight, and have plenty of energy for both work and play. A healthy lifestyle is something you can share with your whole family. A healthy lifestyle also can lower your risk for serious health problems, such as high blood pressure, heart disease, and diabetes. You can follow a few steps listed below to improve your health and the health of your family. Follow-up care is a key part of your treatment and safety. Be sure to make and go to all appointments, and call your doctor if you are having problems. Its also a good idea to know your test results and keep a list of the medicines you take. How can you care for yourself at home? · Do not eat too much sugar, fat, or fast foods. You can still have dessert and treats now and then. The goal is moderation. · Start small to improve your eating habits. Pay attention to portion sizes, drink less juice and soda pop, and eat more fruits and vegetables. ¨ Eat a healthy amount of food. A 3-ounce serving of meat, for example, is about the size of a deck of cards. Fill the rest of your plate with vegetables and whole grains. ¨ Limit the amount of soda and sports drinks you have every day. Drink more water when you are thirsty. ¨ Eat at least 5 servings of fruits and vegetables every day. It may seem like a lot, but it is not hard to reach this goal. A serving or helping is 1 piece of fruit, 1 cup of vegetables, or 2 cups of leafy, raw vegetables. Have an apple or some carrot sticks as an afternoon snack instead of a candy bar. Try to have fruits and/or vegetables at every meal.  · Make exercise part of your daily routine.  You may want to start with simple activities, such as walking, bicycling, or slow swimming. Try to be active 30 to 60 minutes every day. You do not need to do all 30 to 60 minutes all at once. For example, you can exercise 3 times a day for 10 or 20 minutes. Moderate exercise is safe for most people, but it is always a good idea to talk to your doctor before starting an exercise program.  · Keep moving. Janie Hare the lawn, work in the garden, or [employer]. Take the stairs instead of the elevator at work. · If you smoke, quit. People who smoke have an increased risk for heart attack, stroke, cancer, and other lung illnesses. Quitting is hard, but there are ways to boost your chance of quitting tobacco for good. ¨ Use nicotine gum, patches, or lozenges. ¨ Ask your doctor about stop-smoking programs and medicines. ¨ Keep trying. In addition to reducing your risk of diseases in the future, you will notice some benefits soon after you stop using tobacco. If you have shortness of breath or asthma symptoms, they will likely get better within a few weeks after you quit. · Limit how much alcohol you drink. Moderate amounts of alcohol (up to 2 drinks a day for men, 1 drink a day for women) are okay. But drinking too much can lead to liver problems, high blood pressure, and other health problems. Family health  If you have a family, there are many things you can do together to improve your health. · Eat meals together as a family as often as possible. · Eat healthy foods. This includes fruits, vegetables, lean meats and dairy, and whole grains. · Include your family in your fitness plan. Most people think of activities such as jogging or tennis as the way to fitness, but there are many ways you and your family can be more active. Anything that makes you breathe hard and gets your heart pumping is exercise. Here are some tips:  ¨ Walk to do errands or to take your child to school or the bus.   ¨ Go for a family bike ride after dinner instead of watching TV. Where can you learn more? Go to http://isadora-zhanna.info/. Enter Y824 in the search box to learn more about \"A Healthy Lifestyle: Care Instructions. \"  Current as of: July 26, 2016  Content Version: 11.2  © 0922-1784 [employer]. Care instructions adapted under license by [employer] (which disclaims liability or warranty for this information). If you have questions about a medical condition or this instruction, always ask your healthcare professional. [address] any warranty or liability for your use of this information.

## 2017-04-19 NOTE — MR AVS SNAPSHOT
Visit Information Date & Time Provider Department Dept. Phone Encounter #  
 4/19/2017 11:00 AM aVlentine Garvey MD [facility] at [facility] [phone] [id number] Your Appointments 4/21/2017 11:15 AM  
ROUTINE CARE with Abi Eagle MD  
[facility] Internal Medicine [address]) Appt Note: .  
 [address] [facility] [address] [place] [id number]  
[address]  [place] [address] Upcoming Health Maintenance Date Due DTaP/Tdap/Td series (1 - Tdap) 10/1/1942 ZOSTER VACCINE AGE 60> 10/1/1981 Pneumococcal 65+ Low/Medium Risk (1 of 2 - PCV13) 10/1/1986 GLAUCOMA SCREENING Q2Y 8/6/2014 INFLUENZA AGE 9 TO ADULT 8/1/2016 MEDICARE YEARLY EXAM 7/9/2017 Allergies as of 4/19/2017  Review Complete On: 4/19/2017 By: Rory  Severity Noted Reaction Type Reactions Atenolol  11/09/2015    Unknown (comments) On history from Dr. Marciano Eastman office Benicar [Olmesartan]  11/09/2015    Unknown (comments) Lipitor [Atorvastatin]  10/18/2010    Other (comments) Muscle aches Current Immunizations  Reviewed on 10/17/2013 Name Date Influenza High Dose Vaccine PF 10/30/2014, 10/17/2013 Influenza Vaccine Split 10/19/2012 Not reviewed this visit You Were Diagnosed With   
  
 Codes Comments Alzheimer's dementia with behavioral disturbance, unspecified timing of dementia onset    -  Primary ICD-10-CM: G30.8, F02.81 ICD-9-CM: 331.0, 294.11 Hallucinations     ICD-10-CM: R44.3 ICD-9-CM: 780.1 Vitals BP Pulse Height(growth percentile) Weight(growth percentile) SpO2 BMI  
 120/60 60 5' 8\" (1.727 m) 132 lb (59.9 kg) 97% 20.07 kg/m2 Smoking Status Former Smoker BMI and BSA Data Body Mass Index Body Surface Area 20.07 kg/m 2 1.7 m 2 Preferred Pharmacy Pharmacy Name Phone [facility] #[id number]- Kami Juaresri, [address] [place] [address] [phone] Your Updated Medication List  
  
   
This list is accurate as of: 4/19/17 12:20 PM.  Always use your most recent med list. amLODIPine 5 mg tablet Commonly known as:  Madrid Karina Take 1 Tab by mouth two (2) times a day. aspirin 81 mg tablet Take 81 mg by mouth daily (with dinner). IMDUR 30 mg tablet Generic drug:  isosorbide mononitrate ER Take 30 mg by mouth daily. memantine 7-14-21-28 mg C24k Commonly known as:  NAMENDA XR Take 1 Tab by mouth daily. pindolol 5 mg tablet Commonly known as:  VISKIN  
TAKE 1/2 A TABLET BY MOUTH DAILY QUEtiapine 25 mg tablet Commonly known as:  SEROquel Take 1 Tab by mouth nightly. SYSTANE (PF) 0.4-0.3 % Dpet ophthalmic solution Generic drug:  peg 400-propylene glycol (PF) Administer 1 Drop to both eyes daily. terazosin 5 mg capsule Commonly known as:  HYTRIN Take 5 mg by mouth nightly. TRAVATAN Z 0.004 % ophthalmic solution Generic drug:  travoprost  
Administer 1 Drop to both eyes nightly. TYLENOL 325 mg tablet Generic drug:  acetaminophen Take  by mouth every four (4) hours as needed for Pain. VITAMIN D3 1,000 unit Cap Generic drug:  cholecalciferol Take 1,000 Units by mouth daily. Prescriptions Sent to Pharmacy Refills  
 memantine (NAMENDA XR) 7-14-21-28 mg C24k 0 Sig: Take 1 Tab by mouth daily. Class: Normal  
 Pharmacy: [facility]/pharmacy #[id number] Ph #: [phone] Route: Oral  
 QUEtiapine (SEROQUEL) 25 mg tablet 2 Sig: Take 1 Tab by mouth nightly. Class: Normal  
 Pharmacy: [facility]/pharmacy #[id number] Ph #: [phone] Route: Oral  
  
To-Do List   
 04/19/2017 Imaging:  CT HEAD WO CONT Patient Instructions 1.  CT scan of the head 2. Namenda XR 7 mg every day for a week, 14 mg every day for a week, 21 mg every day for a week and 28 mg every day after that 3. Seroquel 25 mg p.o. nightly 4. Follow-up in 3 months A Healthy Lifestyle: Care Instructions Your Care Instructions A healthy lifestyle can help you feel good, stay at a healthy weight, and have plenty of energy for both work and play. A healthy lifestyle is something you can share with your whole family. A healthy lifestyle also can lower your risk for serious health problems, such as high blood pressure, heart disease, and diabetes. You can follow a few steps listed below to improve your health and the health of your family. Follow-up care is a key part of your treatment and safety. Be sure to make and go to all appointments, and call your doctor if you are having problems. Its also a good idea to know your test results and keep a list of the medicines you take. How can you care for yourself at home? · Do not eat too much sugar, fat, or fast foods. You can still have dessert and treats now and then. The goal is moderation. · Start small to improve your eating habits. Pay attention to portion sizes, drink less juice and soda pop, and eat more fruits and vegetables. ¨ Eat a healthy amount of food. A 3-ounce serving of meat, for example, is about the size of a deck of cards. Fill the rest of your plate with vegetables and whole grains. ¨ Limit the amount of soda and sports drinks you have every day. Drink more water when you are thirsty. ¨ Eat at least 5 servings of fruits and vegetables every day. It may seem like a lot, but it is not hard to reach this goal. A serving or helping is 1 piece of fruit, 1 cup of vegetables, or 2 cups of leafy, raw vegetables. Have an apple or some carrot sticks as an afternoon snack instead of a candy bar.  Try to have fruits and/or vegetables at every meal. 
 · Make exercise part of your daily routine. You may want to start with simple activities, such as walking, bicycling, or slow swimming. Try to be active 30 to 60 minutes every day. You do not need to do all 30 to 60 minutes all at once. For example, you can exercise 3 times a day for 10 or 20 minutes. Moderate exercise is safe for most people, but it is always a good idea to talk to your doctor before starting an exercise program. 
· Keep moving. Chrystal Gravely the lawn, work in the garden, or [employer]. Take the stairs instead of the elevator at work. · If you smoke, quit. People who smoke have an increased risk for heart attack, stroke, cancer, and other lung illnesses. Quitting is hard, but there are ways to boost your chance of quitting tobacco for good. ¨ Use nicotine gum, patches, or lozenges. ¨ Ask your doctor about stop-smoking programs and medicines. ¨ Keep trying. In addition to reducing your risk of diseases in the future, you will notice some benefits soon after you stop using tobacco. If you have shortness of breath or asthma symptoms, they will likely get better within a few weeks after you quit. · Limit how much alcohol you drink. Moderate amounts of alcohol (up to 2 drinks a day for men, 1 drink a day for women) are okay. But drinking too much can lead to liver problems, high blood pressure, and other health problems. Family health If you have a family, there are many things you can do together to improve your health. · Eat meals together as a family as often as possible. · Eat healthy foods. This includes fruits, vegetables, lean meats and dairy, and whole grains. · Include your family in your fitness plan. Most people think of activities such as jogging or tennis as the way to fitness, but there are many ways you and your family can be more active. Anything that makes you breathe hard and gets your heart pumping is exercise. Here are some tips: ¨ Walk to do errands or to take your child to school or the bus. ¨ Go for a family bike ride after dinner instead of watching TV. Where can you learn more? Go to http://isadora-zhanna.info/. Enter H927 in the search box to learn more about \"A Healthy Lifestyle: Care Instructions. \" Current as of: July 26, 2016 Content Version: 11.2 © 5909-8101 [employer]. Care instructions adapted under license by [employer] (which disclaims liability or warranty for this information). If you have questions about a medical condition or this instruction, always ask your healthcare professional. [address] any warranty or liability for your use of this information. Introducing [facility]! Melissa Aguilar introduces [email or web address] patient portal. Now you can access parts of your medical record, email your doctor's office, and request medication refills online. 1. In your internet browser, go to [email or web address] 2. Click on the First Time User? Click Here link in the Sign In box. You will see the New Member Sign Up page. 3. Enter your [email or web address] Access Code exactly as it appears below. You will not need to use this code after youve completed the sign-up process. If you do not sign up before the expiration date, you must request a new code. · [email or web address] Access Code: [email or web address] Expires: 4/26/2017  3:00 PM 
 
4. Enter the last four digits of your Social Security Number (xxxx) and Date of Birth (mm/dd/yyyy) as indicated and click Submit. You will be taken to the next sign-up page. 5. Create a [email or web address] ID. This will be your [email or web address] login ID and cannot be changed, so think of one that is secure and easy to remember. 6. Create a [email or web address] password. You can change your password at any time. 7. Enter your Password Reset Question and Answer. This can be used at a later time if you forget your password. 8. Enter your e-mail address. You will receive e-mail notification when new information is available in [address] 9. Click Sign Up. You can now view and download portions of your medical record. 10. Click the Download Summary menu link to download a portable copy of your medical information. If you have questions, please visit the Frequently Asked Questions section of the [employer] website. Remember, [employer] is NOT to be used for urgent needs. For medical emergencies, dial 911. Now available from your iPhone and Android! Please provide this summary of care documentation to your next provider. Your primary care clinician is listed as [place] [phone] If you have any questions after today's visit, please call [phone].

## 2017-04-19 NOTE — PROGRESS NOTES
NEUROLOGY NEW PATIENT CONSULATION  REFERRED BY:  Supriya García MD    04/19/17    Chief Complaint   Patient presents with    New Patient     Memory loss       HISTORY OF PRESENT ILLNESS  Ryan Hopkins is a 80 y.o. male who presented to the neurology office for regarding memory problem. The patient was accompanied here by his son and all the history is provided by him. The patient does have visual impairment and does also have problems with hearing. According to him, the patient started having problems with memory when he was in his late [de-identified]. It was noticed that he was slowing down and unable to walk as fast and as far as he was able to do. He started having problems with remembering names and words. By 2015, the patient is dining room table was unusually busy with pills, junk meal, banking statement and other documents. The patient did complain that he is concerned about the because he was receiving. At that point of time the son took over the financial affairs. With time he started having confusion with the medications that he was taking. The patient continued to drive until 0071 but was stopped by the police and was told to stop driving. He continued to cook by himself occasionally until April 2017 but stopped doing that after that. The patient did also start having hallucinations and the patient sees other people in the house. Other than that patient does have inability to recall words while he is talking. He often gets frustrated trying to express himself. He is presently staying at home and there is a caregiver that comes at night. He dresses himself and claims himself after targeting and takes his medications when given to him. He can prepare simple meals. He has not had any imaging study of the brain    Current Outpatient Prescriptions   Medication Sig    memantine (NAMENDA XR) 7-14-21-28 mg C24k Take 1 Tab by mouth daily.     QUEtiapine (SEROQUEL) 25 mg tablet Take 1 Tab by mouth nightly.  acetaminophen (TYLENOL) 325 mg tablet Take  by mouth every four (4) hours as needed for Pain.  pindolol (VISKIN) 5 mg tablet TAKE 1/2 A TABLET BY MOUTH DAILY    peg 400-propylene glycol, PF, (SYSTANE, PF,) 0.4-0.3 % dpet ophthalmic solution Administer 1 Drop to both eyes daily.  travoprost (TRAVATAN Z) 0.004 % ophthalmic solution Administer 1 Drop to both eyes nightly.  amLODIPine (NORVASC) 5 mg tablet Take 1 Tab by mouth two (2) times a day.  Cholecalciferol, Vitamin D3, (VITAMIN D) 1,000 unit Cap Take 1,000 Units by mouth daily.  aspirin 81 mg tablet Take 81 mg by mouth daily (with dinner).  terazosin (HYTRIN) 5 mg capsule Take 5 mg by mouth nightly.  isosorbide mononitrate ER (IMDUR) 30 mg tablet Take 30 mg by mouth daily. No current facility-administered medications for this visit.       Allergies   Allergen Reactions    Atenolol Unknown (comments)     On history from Dr. 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, FACE    Dementia     Glaucoma     Hearing loss     History of prostate cancer 5/24/2011    Hypertension     Hyperthyroidism     Mixed hyperlipidemia     Rectal bleeding     post prostate seeds    Sinus bradycardia     SSS (sick sinus syndrome) ([address]     Stroke ([address]     TIA    Thyroid disease     NO MEDS CURRENTLY (9/12/16)     Past Surgical History:   Procedure Laterality Date    HX APPENDECTOMY      patient denies    HX CATARACT REMOVAL Right     HX GI      COLONOSCOPY    HX HERNIA REPAIR      bilateral inguinal    HX PROSTATECTOMY      biopsy AND RADIOACTIVE SEEDS    HX UROLOGICAL      urethral stricture     Family History   Problem Relation Age of Onset    Cancer Mother      liver/brain    Heart Disease Father     Stroke Father      possible unk    Heart Disease Brother     Alcohol abuse Brother     Anesth Problems Neg Hx      Social History   Substance Use Topics    Smoking status: Former Smoker     Packs/day: 1.00     Years: 20.00     Quit date: 11/6/1965    Smokeless tobacco: Never Used    Alcohol use Yes      Comment: occas. REVIEW OF SYSTEMS:   A ten system review of constitutional, cardiovascular, respiratory, musculoskeletal, endocrine, skin, SHEENT, genitourinary, psychiatric and neurologic systems was obtained and is unremarkable with the exception of memory problem, vision impairment and hearing loss     EXAMINATION:   Visit Vitals    /60    Pulse 60    Ht 5' 8\" (1.727 m)    Wt 132 lb (59.9 kg)    SpO2 97%    BMI 20.07 kg/m2        General:   General appearance: Pt is in no acute distress   Distal pulses are preserved  Fundoscopic Exam: Attempted    Neurological Examination:   Mental Status: AAO x1 (he knows his name and year but does not the month. He knows that he stays in [place] but does not remember the ZIP Code). Angy Ca Speech is slow. Follows commands, has poor fund of knowledge, attention, short term recall, comprehension and insight. Cranial Nerves: Visual fields could not be assessed. PERRL, Extraocular movements are full. Facial sensation intact V1- V3. Facial movement intact, symmetric. Hearing intact to conversation. Palate elevates symmetrically. Shoulder shrug symmetric. Tongue midline. Motor: Strength is 5/5 in all 4 ext. No atrophy. Tone: Normal    Sensation: Normal to light touch    Reflexes: DTRs trace throughout. Plantar responses downgoing. Coordination/Cerebellar: Deferred because of vision    Gait: Deferred because of fall risk    Skin: No significant bruising or lacerations.     Laboratory review:   Results for orders placed or performed in visit on 12/13/16   AMB EXT CREATININE   Result Value Ref Range    Creatinine, External 1.58        Imaging review:  None    Documentation review:  None    Assessment/Plan:   Stevie Chan Sr. is a 80 y.o. male who presented to the neurology office for management of memory problem. He is oriented ×1. He does seem to have moderate to severely advanced dementia. He has not had any imaging of the brain and I do plan to get CT scan of the head. I do plan to start him on titrating dose of Namenda XR. Aricept cannot be tried because of his bradycardia. The patient will also be restarted on Seroquel 25 mg p.o. nightly for agitation and hallucination. The patient is living by himself but needs to be in assisted living. In fact, getting into a nursing home is not a bad idea. Follow-up in 3 months      ICD-10-CM ICD-9-CM    1. Alzheimer's dementia with behavioral disturbance, unspecified timing of dementia onset G30.8 331.0 memantine (NAMENDA XR) 7-14-21-28 mg C24k    F02.81 294.11 CT HEAD WO CONT   2. Hallucinations R44.3 780.1 QUEtiapine (SEROQUEL) 25 mg tablet      Thank you for allowing me to participate in the care of . Sergio Michael. Please feel free to contact me if you have any questions. Michelle Gaines MD  Diplomate, American Board of Psychiatry & Neurology (Neurology)  [place] Board of Psychiatry & Neurology (Clinical Neurophysiology)    CC: Radha Mcgraw MD  Fax: [phone]    This note was created using voice recognition software. Despite editing, there may be syntax errors. This note will not be viewable in [address]

## 2017-04-21 PROBLEM — J61 PULMONARY ASBESTOSIS (HCC): Status: ACTIVE | Noted: 2017-01-01

## 2017-04-21 PROBLEM — F03.90 DEMENTIA WITHOUT BEHAVIORAL DISTURBANCE (HCC): Status: ACTIVE | Noted: 2017-01-01

## 2017-04-21 NOTE — PROGRESS NOTES
HPI:  Guilherme Major is a 80y.o. year old male who is here for a routine visit and followup for admit to assisted living. He has increase in confusion as well as visual hallucinations and auditory. He saw neurology this week and seroquel was added at night as well as namenda. His son has not started the namenda. Head CT has not been done either. He is feeling OK but has been wandering at night and now has night time caregivers. No headache or dizziness. Unable to give much history due to severe hearing loss and vision as well. Conversation with the son about doing more testing and he wishes to defer any further aggressive evaluation due to his age and dementia. Past Medical History:   Diagnosis Date    Arrhythmia     irregular    Arthritis     SHOULDERS AND BACK    BPH (benign prostatic hypertrophy)     Cancer (HCC) 8/00    prostate - seed implants    Cancer ([address]     BLADDER    Cancer ([address]     SKIN CA ON HEAD, FACE    Dementia     Glaucoma     Hearing loss     History of prostate cancer 5/24/2011    Hypertension     Hyperthyroidism     Mixed hyperlipidemia     Rectal bleeding     post prostate seeds    Sinus bradycardia     SSS (sick sinus syndrome) ([address]     Stroke ([address]     TIA    Thyroid disease     NO MEDS CURRENTLY (9/12/16)       Past Surgical History:   Procedure Laterality Date    HX APPENDECTOMY      patient denies    HX CATARACT REMOVAL Right     HX GI      COLONOSCOPY    HX HERNIA REPAIR      bilateral inguinal    HX PROSTATECTOMY      biopsy AND RADIOACTIVE SEEDS    HX UROLOGICAL      urethral stricture       Prior to Admission medications    Medication Sig Start Date End Date Taking? Authorizing Provider   QUEtiapine (SEROQUEL) 25 mg tablet Take 1 Tab by mouth nightly. 4/19/17  Yes Aldo Buchanan MD   acetaminophen (TYLENOL) 325 mg tablet Take  by mouth every four (4) hours as needed for Pain.    Yes Historical Provider   pindolol (VISKIN) 5 mg tablet TAKE 1/2 A TABLET BY MOUTH DAILY 11/25/16  Yes Patricia Russ III, MD   peg 400-propylene glycol, PF, (SYSTANE, PF,) 0.4-0.3 % dpet ophthalmic solution Administer 1 Drop to both eyes daily. Yes Historical Provider   travoprost (TRAVATAN Z) 0.004 % ophthalmic solution Administer 1 Drop to both eyes nightly. Yes Historical Provider   amLODIPine (NORVASC) 5 mg tablet Take 1 Tab by mouth two (2) times a day. 10/19/12  Yes Patricia Russ III, MD   Cholecalciferol, Vitamin D3, (VITAMIN D) 1,000 unit Cap Take 1,000 Units by mouth daily. Yes Historical Provider   aspirin 81 mg tablet Take 81 mg by mouth daily (with dinner). Yes Historical Provider   terazosin (HYTRIN) 5 mg capsule Take 5 mg by mouth nightly. Yes Historical Provider   isosorbide mononitrate ER (IMDUR) 30 mg tablet Take 30 mg by mouth daily. Yes Historical Provider   memantine (NAMENDA XR) 7-14-21-28 mg C24k Take 1 Tab by mouth daily. 4/19/17   Esther Maldonado MD       Social History     Social History    Marital status:      Spouse name: N/A    Number of children: N/A    Years of education: N/A     Occupational History    Not on file. Social History Main Topics    Smoking status: Former Smoker     Packs/day: 1.00     Years: 20.00     Quit date: 11/6/1965    Smokeless tobacco: Never Used    Alcohol use Yes      Comment: occas.     Drug use: No    Sexual activity: Not on file     Other Topics Concern    Not on file     Social History Narrative          ROS  Per HPI    Visit Vitals    /52    Pulse (!) 57    Temp 97.5 °F (36.4 °C) (Oral)    Resp 12    Ht 5' 8\" (1.727 m)    Wt 131 lb (59.4 kg)    SpO2 97%    BMI 19.92 kg/m2         Physical Exam   Physical Examination: General appearance - alert, well appearing, and in no distress  Mouth - mucous membranes moist, pharynx normal without lesions  Neck - supple, no significant adenopathy  Lymphatics - no palpable lymphadenopathy, no hepatosplenomegaly  Chest - clear to auscultation, no wheezes, rales or rhonchi, symmetric air entry  Heart - normal rate, regular rhythm, normal S1, S2, no murmurs, rubs, clicks or gallops  Abdomen - soft, nontender, nondistended, no masses or organomegaly  Extremities - peripheral pulses normal, no pedal edema, no clubbing or cyanosis    Assessment/Plan:  Roselia Charlton was seen today for form completion. Diagnoses and all orders for this visit:    Dementia with behavioral disturbance, unspecified dementia type - appears to be organic. Check urine and will start the namenda after we see how he does with the seroquel.   -     UA/M W/RFLX CULTURE, ROUTINE    Delirium - appears to be chronic and will check UA to be sure not related to UTI  -     AMB POC URINALYSIS DIP STICK AUTO W/O MICRO  -     UA/M W/RFLX CULTURE, ROUTINE    Pulmonary asbestosis ([facility] - seen on CXR. Discussed with the son and he is aware. Sundowning  -     UA/M W/RFLX CULTURE, ROUTINE       Follow-up Disposition: as needed. Advised him to call back or return to office if symptoms worsen/change/persist.  Discussed expected course/resolution/complications of diagnosis in detail with patient. Medication risks/benefits/costs/interactions/alternatives discussed with patient. He was given an after visit summary which includes diagnoses, current medications, & vitals. He expressed understanding with the diagnosis and plan.

## 2017-04-21 NOTE — PROGRESS NOTES
Chief Complaint   Patient presents with    Form Completion     forms for assisted living facility      Results for orders placed or performed in visit on 04/21/17   AMB POC URINALYSIS DIP STICK AUTO W/O MICRO     Status: None   Result Value Ref Range Status    Color (UA POC) Yellow  Final    Clarity (UA POC) Clear  Final    Glucose (UA POC) Negative Negative Final    Bilirubin (UA POC) Negative Negative Final    Ketones (UA POC) Negative Negative Final    Specific gravity (UA POC) 1.015 1.001 - 1.035 Final    Blood (UA POC) Trace Negative Final     Comment: lysed    pH (UA POC) 5.5 4.6 - 8.0 Final    Protein (UA POC) 1+ Negative mg/dL Final    Urobilinogen (UA POC) 0.2 mg/dL 0.2 - 1 Final    Nitrites (UA POC) Negative Negative Final    Leukocyte esterase (UA POC) Negative Negative Final       Orders Placed This Encounter    UA/M W/RFLX CULTURE, ROUTINE    AMB POC URINALYSIS DIP STICK AUTO W/O MICRO     The above orders were approved via verbal order by Dr. Julian Tovar III.

## 2017-04-21 NOTE — LETTER
Patient Active Problem List  
Diagnosis Code  Mixed hyperlipidemia E78.2  
 Essential hypertension I10  Thrombocytopenia ([facility] D69.6  Acquired hypothyroidism E03.9  History of prostate cancer Z85.46  
 CRI (chronic renal insufficiency) N18.9  Glaucoma H40.9  Advance directive on file R76.1  Bilateral hearing loss H91.93  
 Hearing loss H91.90  Pulmonary asbestosis ([facility] V34  Dementia without behavioral disturbance F03.90 Allergies as of 04/21/2017 - Review Complete 04/21/2017 Allergen Reaction Noted  Atenolol Unknown (comments) 11/09/2015  Benicar [olmesartan] Unknown (comments) 11/09/2015  Lipitor [atorvastatin] Other (comments) 10/18/2010 Current Outpatient Prescriptions on File Prior to Visit Medication Sig Dispense Refill  QUEtiapine (SEROQUEL) 25 mg tablet Take 1 Tab by mouth nightly. 30 Tab 2  
 acetaminophen (TYLENOL) 325 mg tablet Take  by mouth every four (4) hours as needed for Pain.  pindolol (VISKIN) 5 mg tablet TAKE 1/2 A TABLET BY MOUTH DAILY 45 Tab 1  peg 400-propylene glycol, PF, (SYSTANE, PF,) 0.4-0.3 % dpet ophthalmic solution Administer 1 Drop to both eyes daily.  travoprost (TRAVATAN Z) 0.004 % ophthalmic solution Administer 1 Drop to both eyes nightly.  amLODIPine (NORVASC) 5 mg tablet Take 1 Tab by mouth two (2) times a day.  Cholecalciferol, Vitamin D3, (VITAMIN D) 1,000 unit Cap Take 1,000 Units by mouth daily.  aspirin 81 mg tablet Take 81 mg by mouth daily (with dinner).  terazosin (HYTRIN) 5 mg capsule Take 5 mg by mouth nightly.  isosorbide mononitrate ER (IMDUR) 30 mg tablet Take 30 mg by mouth daily.  memantine (NAMENDA XR) 7-14-21-28 mg C24k Take 1 Tab by mouth daily. 1 Package 0 No current facility-administered medications on file prior to visit.

## 2017-04-21 NOTE — MR AVS SNAPSHOT
Visit Information Date & Time Provider Department Dept. Phone Encounter #  
 4/21/2017 11:15 AM Presley Hendrix MD [facility] Internal Medicine [phone] [id number] Your Appointments 7/19/2017  1:40 PM  
Follow Up with Rajinder Wood MD  
Neurology Clinic at [facility]) Appt Note: FU Dementia,CP,$0,adt,4/19/2017  
 [facility], 
[address], [address] [id number]  
[address], [address], [address] [phone] Upcoming Health Maintenance Date Due DTaP/Tdap/Td series (1 - Tdap) [birth year]1/1942 ZOSTER VACCINE AGE 60> 10/1/1981 Pneumococcal 65+ Low/Medium Risk (1 of 2 - PCV13) 10/1/1986 GLAUCOMA SCREENING Q2Y 8/6/2014 INFLUENZA AGE 9 TO ADULT 8/1/2016 MEDICARE YEARLY EXAM 4/21/2018 Allergies as of 4/21/2017  Review Complete On: 4/21/2017 By: Latonya Lee LPN Severity Noted Reaction Type Reactions Atenolol  11/09/2015    Unknown (comments) On history from Dr. Messi Jordan office Benicar [Olmesartan]  11/09/2015    Unknown (comments) Lipitor [Atorvastatin]  10/18/2010    Other (comments) Muscle aches Current Immunizations  Reviewed on 10/17/2013 Name Date Influenza High Dose Vaccine PF 10/30/2014, 10/17/2013 Influenza Vaccine Split 10/19/2012 Not reviewed this visit Vitals BP Pulse Temp Resp Height(growth percentile) Weight(growth percentile) 142/52 (!) 57 97.5 °F (36.4 °C) (Oral) 12 5' 8\" (1.727 m) 131 lb (59.4 kg) SpO2 BMI Smoking Status 97% 19.92 kg/m2 Former Smoker Vitals History BMI and BSA Data Body Mass Index Body Surface Area  
 19.92 kg/m 2 1.69 m 2 Preferred Pharmacy Pharmacy Name Phone [id number]/ [address] [place] [phone] Your Updated Medication List  
  
   
 This list is accurate as of: 4/21/17 11:57 AM.  Always use your most recent med list. amLODIPine 5 mg tablet Commonly known as:  Jakob Pupa Take 1 Tab by mouth two (2) times a day. aspirin 81 mg tablet Take 81 mg by mouth daily (with dinner). IMDUR 30 mg tablet Generic drug:  isosorbide mononitrate ER Take 30 mg by mouth daily. memantine 7-14-21-28 mg C24k Commonly known as:  NAMENDA XR Take 1 Tab by mouth daily. pindolol 5 mg tablet Commonly known as:  VISKIN  
TAKE 1/2 A TABLET BY MOUTH DAILY QUEtiapine 25 mg tablet Commonly known as:  SEROquel Take 1 Tab by mouth nightly. SYSTANE (PF) 0.4-0.3 % Dpet ophthalmic solution Generic drug:  peg 400-propylene glycol (PF) Administer 1 Drop to both eyes daily. terazosin 5 mg capsule Commonly known as:  HYTRIN Take 5 mg by mouth nightly. TRAVATAN Z 0.004 % ophthalmic solution Generic drug:  travoprost  
Administer 1 Drop to both eyes nightly. TYLENOL 325 mg tablet Generic drug:  acetaminophen Take  by mouth every four (4) hours as needed for Pain. VITAMIN D3 1,000 unit Cap Generic drug:  cholecalciferol Take 1,000 Units by mouth daily. Introducing [facility]! [facility] introduces [employer] patient portal. Now you can access parts of your medical record, email your doctor's office, and request medication refills online. 1. In your internet browser, go to [email or web address] 2. Click on the First Time User? Click Here link in the Sign In box. You will see the New Member Sign Up page. 3. Enter your [employer] Access Code exactly as it appears below. You will not need to use this code after youve completed the sign-up process. If you do not sign up before the expiration date, you must request a new code. · [employer] Access Code: [email or web address] Expires: 4/26/2017  3:00 PM 
 
 4. Enter the last four digits of your Social Security Number (xxxx) and Date of Birth (mm/dd/yyyy) as indicated and click Submit. You will be taken to the next sign-up page. 5. Create a [employer] ID. This will be your [employer] login ID and cannot be changed, so think of one that is secure and easy to remember. 6. Create a [employer] password. You can change your password at any time. 7. Enter your Password Reset Question and Answer. This can be used at a later time if you forget your password. 8. Enter your e-mail address. You will receive e-mail notification when new information is available in [address] 9. Click Sign Up. You can now view and download portions of your medical record. 10. Click the Download Summary menu link to download a portable copy of your medical information. If you have questions, please visit the Frequently Asked Questions section of the [employer] website. Remember, [employer] is NOT to be used for urgent needs. For medical emergencies, dial 911. Now available from your iPhone and Android! Please provide this summary of care documentation to your next provider. Your primary care clinician is listed as [place] [phone] If you have any questions after today's visit, please call [phone].

## 2017-04-24 NOTE — PROGRESS NOTES
Received referral from [employer] on 4/24/17 that patient was admitted to [facility] on 4/23/17 for dementia at [facility]. Will attempt to reach out to patient as appropriate when discharged.

## 2017-05-05 NOTE — PROGRESS NOTES
[address] [facility] Follow Up for [facility] Admission from 4/23/17 - 5/5/17 for subdural hematoma, dementia with behavioral disturbances    NN called son, Sayda Cueva. He indicated that his dad has been transferred to [address] will be following him. Expressed sympathy and told him to call if he needs anything. Pt was admitted to [facility] after trying to escape from a memory unit his first night there through the window. Pt was found combative in the bushes outside. Pt was paranoid that food and meds in hospital were poison and refused to eat and take meds. He suffered sever sundowning and became combative with night nurses. Psychiatry became involved and he was started on new meds to treat symptoms, but still continued with poor intake. Pt eventually became very weak, not participating with therapy and slept most of time. Family decided hospice was best choice. Ade Harkins NP)  [facility] was listed in his chart.

## 2024-04-03 NOTE — TELEPHONE ENCOUNTER
Called and spoke with Dane Jose at Dr. Adriel Chong office and advised that [address] does rec pt to neuro. Called son Sudha Yanez back and told him to call the office and ask to speak to Dane Jose and she will help him with appt. Statement Selected